# Patient Record
Sex: FEMALE | Race: AMERICAN INDIAN OR ALASKA NATIVE
[De-identification: names, ages, dates, MRNs, and addresses within clinical notes are randomized per-mention and may not be internally consistent; named-entity substitution may affect disease eponyms.]

---

## 2017-05-08 ENCOUNTER — HOSPITAL ENCOUNTER (EMERGENCY)
Dept: HOSPITAL 5 - ED | Age: 27
Discharge: HOME | End: 2017-05-08
Payer: COMMERCIAL

## 2017-05-08 VITALS — SYSTOLIC BLOOD PRESSURE: 105 MMHG | DIASTOLIC BLOOD PRESSURE: 66 MMHG
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
## 2017-05-08 DIAGNOSIS — S50.812A: Primary | ICD-10-CM
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
## 2017-05-08 DIAGNOSIS — J45.909: ICD-10-CM
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
## 2017-05-08 DIAGNOSIS — M79.1: ICD-10-CM

## 2017-05-08 PROCEDURE — 93005 ELECTROCARDIOGRAM TRACING: CPT

## 2017-05-08 PROCEDURE — 73080 X-RAY EXAM OF ELBOW: CPT

## 2017-05-08 PROCEDURE — 90715 TDAP VACCINE 7 YRS/> IM: CPT

## 2017-05-08 PROCEDURE — A6250 SKIN SEAL PROTECT MOISTURIZR: HCPCS

## 2017-05-08 PROCEDURE — 99284 EMERGENCY DEPT VISIT MOD MDM: CPT

## 2017-05-08 PROCEDURE — 90471 IMMUNIZATION ADMIN: CPT

## 2017-05-08 PROCEDURE — 93010 ELECTROCARDIOGRAM REPORT: CPT

## 2017-05-08 PROCEDURE — 73090 X-RAY EXAM OF FOREARM: CPT

## 2017-05-08 PROCEDURE — 71020: CPT

## 2017-05-08 NOTE — XRAY REPORT
ROUTINE CHEST, TWO VIEWS:



HISTORY:  chest pain.



The trachea, heart, mediastinal contour, lung fields and bony thorax 

are unremarkable.



IMPRESSION:

Unremarkable chest x-ray.

## 2017-05-08 NOTE — XRAY REPORT
LEFT FOREARM:



History: Left forearm pain.



AP and lateral views of the forearm demonstrate normal

mineralization and contours for this patient's age.  No destructive

changes are noted and the adjacent soft tissues are normal.



IMPRESSION:

Normal left forearm.

## 2017-05-08 NOTE — XRAY REPORT
LEFT ELBOW, 3 views:



History: Left elbow pain



The bony architecture is intact without evidence of fracture or

dislocation.  No significant soft tissue abnormality is seen.



IMPRESSION:

Normal left elbow.
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
## 2017-07-30 ENCOUNTER — HOSPITAL ENCOUNTER (EMERGENCY)
Dept: HOSPITAL 5 - ED | Age: 27
LOS: 1 days | Discharge: HOME | End: 2017-07-31
Payer: COMMERCIAL

## 2017-07-30 DIAGNOSIS — Z91.013: ICD-10-CM

## 2017-07-30 DIAGNOSIS — J45.909: ICD-10-CM

## 2017-07-30 DIAGNOSIS — K64.9: Primary | ICD-10-CM

## 2017-07-31 VITALS — DIASTOLIC BLOOD PRESSURE: 56 MMHG | SYSTOLIC BLOOD PRESSURE: 100 MMHG

## 2017-07-31 NOTE — EMERGENCY DEPARTMENT REPORT
ED General Adult HPI





- General


Chief complaint: Rectal Pain


Stated complaint: HEMORRHOIDS


Time Seen by Provider: 17 01:24


Source: patient


Mode of arrival: Ambulatory


Limitations: No Limitations





- History of Present Illness


Initial comments: 





This is a 27-year-old female nontoxic, well nourished in appearance, no acute 

signs of distress the procedure ED complaining of hemorrhoids 2 days.  Patient 

stated has a history of hemorrhoids with last occurrence was 21 months ago when 

she gave birth.  Patient describes pain as sharp and throbbing level of 10 out 

of 10.  Patient agrees to minimal bleeding during episode but denies bleeding 

6 hours from now.  Patient stated she just had a bowel movement and is relieved 

from pain.  Patient also stated she was able to put hemorrhoids back into anus. 

Patient denies any fever, chills, headache, bleeding, dizziness, blurred vision

, chest pain, shortness of breath, numbness or tingling.  Patient states 

allergies to shellfish and history of asthma.  Last menstrual period 17.


MD Complaint: hemorrhoids


-: Gradual, days(s) (2)


Radiation: non-radiation


Severity scale (0 -10): 10


Quality: stabbing, other (throbbing)


Consistency: intermittent


Improves with: none


Worsens with: none


Associated Symptoms: denies other symptoms.  denies: confusion, chest pain, 

cough, diaphoresis, fever/chills, headaches, loss of appetite, malaise, nausea/

vomiting, rash, seizure, shortness of breath, syncope, weakness


Treatments Prior to Arrival: none





- Related Data


 Previous Rx's











 Medication  Instructions  Recorded  Last Taken  Type


 


Cyclobenzaprine [Flexeril 10 MG 10 mg PO QHS #20 tablet 17 Unknown Rx





TAB]    


 


Ibuprofen [Motrin] 800 mg PO Q8HR PRN #30 tablet 17 Unknown Rx


 


Hydrocortisone/Lidocaine/Aloe 1 each RC DAILY #1 kit 17 Unknown Rx





[Lidocaine-Hc 2-2% Cream Kit]    











 Allergies











Allergy/AdvReac Type Severity Reaction Status Date / Time


 


shellfish derived Allergy  Hives Verified 14 04:58














ED Review of Systems


ROS: 


Stated complaint: HEMORRHOIDS


Other details as noted in HPI





Constitutional: denies: chills, fever


Eyes: denies: eye pain, eye discharge, vision change


ENT: denies: ear pain, throat pain


Respiratory: denies: cough, shortness of breath, wheezing


Cardiovascular: denies: chest pain, palpitations


Endocrine: no symptoms reported


Gastrointestinal: denies: abdominal pain, nausea, diarrhea


Genitourinary: denies: urgency, dysuria, discharge


Musculoskeletal: denies: back pain, joint swelling, arthralgia


Skin: denies: rash, lesions


Neurological: denies: headache, weakness, paresthesias


Psychiatric: denies: anxiety, depression


Hematological/Lymphatic: denies: easy bleeding, easy bruising





ED Past Medical Hx





- Past Medical History


Previous Medical History?: Yes


Hx Asthma: Yes





- Surgical History


Past Surgical History?: No


Additional Surgical History: 





- Social History


Smoking Status: Never Smoker





- Medications


Home Medications: 


 Home Medications











 Medication  Instructions  Recorded  Confirmed  Last Taken  Type


 


Cyclobenzaprine [Flexeril 10 MG 10 mg PO QHS #20 tablet 17  Unknown Rx





TAB]     


 


Ibuprofen [Motrin] 800 mg PO Q8HR PRN #30 tablet 17  Unknown Rx


 


Hydrocortisone/Lidocaine/Aloe 1 each RC DAILY #1 kit 17  Unknown Rx





[Lidocaine-Hc 2-2% Cream Kit]     














ED Physical Exam





- General


Limitations: No Limitations


General appearance: alert, in no apparent distress





- Head


Head exam: Present: atraumatic, normocephalic, normal inspection





- Eye


Eye exam: Present: normal appearance, PERRL, EOMI.  Absent: scleral icterus, 

conjunctival injection, nystagmus, periorbital swelling, periorbital tenderness


Pupils: Present: normal accommodation





- ENT


ENT exam: Present: normal exam, normal orophraynx, mucous membranes moist, TM's 

normal bilaterally, normal external ear exam





- Neck


Neck exam: Present: normal inspection, full ROM.  Absent: tenderness, 

meningismus, lymphadenopathy, thyromegaly





- Respiratory


Respiratory exam: Present: normal lung sounds bilaterally.  Absent: respiratory 

distress, wheezes, rales, rhonchi, stridor, chest wall tenderness, accessory 

muscle use, decreased breath sounds, prolonged expiratory





- Cardiovascular


Cardiovascular Exam: Present: regular rate, normal rhythm, normal heart sounds.

  Absent: bradycardia, tachycardia, irregular rhythm, systolic murmur, 

diastolic murmur, rubs, gallop





- GI/Abdominal


GI/Abdominal exam: Present: soft, normal bowel sounds.  Absent: distended, 

tenderness, guarding, rebound, rigid, diminished bowel sounds





- Rectal


Rectal exam: Present: deferred, normal inspection, normal rectal tone, other (

chaperone Maru paramedic present during exam).  Absent: heme (+) stool, 

bloody stool, fecal impaction, hemorrhoids (no visual hemorrhoids. ), mass, 

tenderness





- Extremities Exam


Extremities exam: Present: normal inspection, full ROM, normal capillary 

refill.  Absent: tenderness, pedal edema, joint swelling, calf tenderness





- Back Exam


Back exam: Present: normal inspection, full ROM.  Absent: tenderness, CVA 

tenderness (R), CVA tenderness (L), muscle spasm, paraspinal tenderness, 

vertebral tenderness, rash noted





- Neurological Exam


Neurological exam: Present: alert, oriented X3, CN II-XII intact, normal gait, 

reflexes normal.  Absent: abnormal gait





- Psychiatric


Psychiatric exam: Present: normal affect, normal mood





- Skin


Skin exam: Present: warm, dry, intact, normal color.  Absent: rash





ED Course





 Vital Signs











  17





  00:16


 


Temperature 97.8 F


 


Pulse Rate 89


 


Respiratory 18





Rate 


 


Blood Pressure 104/57


 


Blood Pressure 105/57





[Left] 


 


O2 Sat by Pulse 99





Oximetry 














- Reevaluation(s)


Reevaluation #1: 





17 01:44


Patient is able to speak in full sentences with no signs of distress.





ED Medical Decision Making





- Medical Decision Making





ED course; this is a 27-year-old female that presents with subjective 

hemorrhoids





1- patient was examined by myself.  Upon examination with chaperone Maru 

present there is no hemorrhoids present.  Patient stated prior to me examining 

the patient said had a bowel movement and put hemorrhoids back.  Patient denies 

any bleeding episode.


2- patient was educated to increase fiber intake as well as sitz bath.  Patient 

received hydrocortisone/lidocaine ointment at discharge and was instructed to 

follow-up with her primary care doctor/general surgeon in 3-5 days or symptoms 

such as increased bleeding, protruding hemorrhoids, fever, chills, chest pain, 

short of breath, constipation, dizziness, return to emergency room as soon as 

possible.


3- At time time of discharge, the patient does not seem toxic or ill in 

appearance.  No acute signs of distress noted.  Patient agrees to discharge 

treatment plan of care.  No further questions noted by the patient.


Critical care attestation.: 


If time is entered above; I have spent that time in minutes in the direct care 

of this critically ill patient, excluding procedure time.








ED Disposition


Clinical Impression: 


Hemorrhoids


Qualifiers:


 Hemorrhoid type: unspecified Qualified Code(s): K64.9 - Unspecified hemorrhoids





Disposition:  TO HOME OR SELFCARE


Is pt being admited?: No


Does the pt Need Aspirin: No


Condition: Stable


Instructions:  Hemorrhoids (ED), Sitz Bath (GEN), Hydrocortisone/Lidocaine (

Rectal), High Fiber Diet (ED)


Additional Instructions: 


follow-up with your primary care doctor/general surgeon in 3-5 days or symptoms 

such as increased bleeding, protruding hemorrhoids, fever, chills, chest pain, 

short of breath, constipation, dizziness, return to emergency room as soon as 

possible.


Perform sitz bath and increase fiber intake as directed.  Use hydrocortisone/

lidocaine ointment as needed for pain rectally.


Prescriptions: 


Hydrocortisone/Lidocaine/Aloe [Lidocaine-Hc 2-2% Cream Kit] 1 each RC DAILY #1 

kit


Referrals: 


[facility] [Outside] - 3-5 Days


[facility] [Outside] - 3-5 Days


BRIAN PARRY MD [Staff Physician] - 3-5 Days


PRIMARY CARE,MD [Primary Care Provider] - 3-5 Days


AUTUMN YING MD [Staff Physician] - 3-5 Days


Forms:  Work/School Release Form(ED)

## 2020-10-26 ENCOUNTER — HOSPITAL ENCOUNTER (EMERGENCY)
Dept: HOSPITAL 5 - ED | Age: 30
LOS: 2 days | Discharge: PSYCHIATRIC OR SUBSTANCE USE FACILITY | End: 2020-10-28
Payer: SELF-PAY

## 2020-10-26 DIAGNOSIS — Z98.890: ICD-10-CM

## 2020-10-26 DIAGNOSIS — R46.2: Primary | ICD-10-CM

## 2020-10-26 DIAGNOSIS — Z79.899: ICD-10-CM

## 2020-10-26 DIAGNOSIS — Z91.013: ICD-10-CM

## 2020-10-26 DIAGNOSIS — J45.909: ICD-10-CM

## 2020-10-26 LAB
BACTERIA #/AREA URNS HPF: (no result) /HPF
BASOPHILS # (AUTO): 0 K/MM3 (ref 0–0.1)
BASOPHILS NFR BLD AUTO: 0.7 % (ref 0–1.8)
BENZODIAZEPINES SCREEN,URINE: (no result)
BILIRUB UR QL STRIP: (no result)
BLOOD UR QL VISUAL: (no result)
BUN SERPL-MCNC: 22 MG/DL (ref 7–17)
BUN/CREAT SERPL: 24 %
CALCIUM SERPL-MCNC: 9.8 MG/DL (ref 8.4–10.2)
EOSINOPHIL # BLD AUTO: 0 K/MM3 (ref 0–0.4)
EOSINOPHIL NFR BLD AUTO: 0.6 % (ref 0–4.3)
HCT VFR BLD CALC: 39.3 % (ref 30.3–42.9)
HEMOLYSIS INDEX: 12
HGB BLD-MCNC: 13.2 GM/DL (ref 10.1–14.3)
LYMPHOCYTES # BLD AUTO: 1 K/MM3 (ref 1.2–5.4)
LYMPHOCYTES NFR BLD AUTO: 16 % (ref 13.4–35)
MCHC RBC AUTO-ENTMCNC: 34 % (ref 30–34)
MCV RBC AUTO: 87 FL (ref 79–97)
METHADONE SCREEN,URINE: (no result)
MONOCYTES # (AUTO): 0.5 K/MM3 (ref 0–0.8)
MONOCYTES % (AUTO): 7.2 % (ref 0–7.3)
MUCOUS THREADS #/AREA URNS HPF: (no result) /HPF
OPIATE SCREEN,URINE: (no result)
PH UR STRIP: 5 [PH] (ref 5–7)
PLATELET # BLD: 256 K/MM3 (ref 140–440)
RBC # BLD AUTO: 4.53 M/MM3 (ref 3.65–5.03)
RBC #/AREA URNS HPF: 5 /HPF (ref 0–6)
UROBILINOGEN UR-MCNC: < 2 MG/DL (ref ?–2)
WBC #/AREA URNS HPF: 8 /HPF (ref 0–6)

## 2020-10-26 PROCEDURE — 80320 DRUG SCREEN QUANTALCOHOLS: CPT

## 2020-10-26 PROCEDURE — G0480 DRUG TEST DEF 1-7 CLASSES: HCPCS

## 2020-10-26 PROCEDURE — 70450 CT HEAD/BRAIN W/O DYE: CPT

## 2020-10-26 PROCEDURE — 36415 COLL VENOUS BLD VENIPUNCTURE: CPT

## 2020-10-26 PROCEDURE — 85025 COMPLETE CBC W/AUTO DIFF WBC: CPT

## 2020-10-26 PROCEDURE — 80307 DRUG TEST PRSMV CHEM ANLYZR: CPT

## 2020-10-26 PROCEDURE — 80048 BASIC METABOLIC PNL TOTAL CA: CPT

## 2020-10-26 PROCEDURE — 81001 URINALYSIS AUTO W/SCOPE: CPT

## 2020-10-26 PROCEDURE — 84703 CHORIONIC GONADOTROPIN ASSAY: CPT

## 2020-10-26 NOTE — CAT SCAN REPORT
. CT head/brain wo con



INDICATION / CLINICAL INFORMATION:

30 years Female; Delusions, auditory hallucinations new onset.



TECHNIQUE: Routine CT head without contrast. All CT scans at this location are performed using CT dos
e reduction for ALARA by means of automated exposure control.



COMPARISON: 

None.



FINDINGS:



BRAIN / INTRACRANIAL CONTENTS: No acute hemorrhage, mass effect, midline shift, hydrocephalus, or acu
te, large territorial infarct. No chronic infarct or atrophy appreciated. No significant white matter
 abnormality.



CRANIOCERVICAL JUNCTION: No significant abnormality.



ORBITS: No significant abnormality of visualized orbits.

SINUSES / MASTOIDS: No significant abnormality in the visualized paranasal sinuses or mastoid air elijah
ls.



ADDITIONAL FINDINGS: None. 



IMPRESSION:

1. No focal mass, hemorrhage, hydrocephalus, or acute, large territorial infarct. 



Signer Name: Juan Ryder MD, III 

Signed: 10/26/2020 7:30 PM

Workstation Name: [id number] [id number]

## 2020-10-26 NOTE — EMERGENCY DEPARTMENT REPORT
HPI





- General


Chief Complaint: Psych


Time Seen by Provider: 10/26/20 16:26





- HPI


HPI: 





Room 16








The patient is a 30-year-old female present with a chief complaint of bizarre 

behavior.  The patient was brought in by EMS for reports of auditory 

hallucinations, not sleeping eating or bathing for 4 days.  The patient presents

with a 1013 filled out by Saskia Hernadez LPC which states "confused, delusions,

singing answers, referring to self and third person.  No sleeping/eating in 3 to

4 days.  No ADLs in 3 to 4 days, delusions, confusion."  When asked why she was 

brought to emergency department the patient states "I was just talking about 

malingering in [facility]."  When asked what this means the patient does not 

respond.  The patient just repeats "lingering at the [facility]'s 

office."  When asked that she has had suicidal ideation patient replies "I do 

not know."  Patient denies homicidal ideation.





ED Past Medical Hx





- Past Medical History


Hx Asthma: Yes





- Surgical History


Additional Surgical History: 





- Family History


Family history: no significant





- Social History


Smoking Status: Never Smoker


Substance Use Type: None (Denies illicit drug use)





- Medications


Home Medications: 


                                Home Medications











 Medication  Instructions  Recorded  Confirmed  Last Taken  Type


 


Cyclobenzaprine [Flexeril 10 MG 10 mg PO QHS #20 tablet 05/08/17 10/26/20 

Unknown Rx





TAB]     


 


Ibuprofen [Motrin] 800 mg PO Q8HR PRN #30 tablet 05/08/17 10/26/20 Unknown Rx


 


Lidocaine/Hydrocortisone AC 1 each RC DAILY #1 kit 07/31/17 10/26/20 Unknown Rx





[Lidocaine-Hc 2-2% Cream Kit]     














ED Review of Systems


ROS: 


Stated complaint: DELUSIONAL THOUGHTS/CONFUSION


Other details as noted in HPI





Constitutional: no symptoms reported


Respiratory: no symptoms reported


Endocrine: no symptoms reported


Psychiatric: auditory hallucinations, suicidal thoughts (?)





Physical Exam





- Physical Exam


Vital Signs: 


                                   Vital Signs











  10/26/20





  16:18


 


Temperature 98.7 F


 


Pulse Rate 92 H


 


Respiratory 18





Rate 


 


Blood Pressure 117/82





[Left] 


 


O2 Sat by Pulse 97





Oximetry 











Physical Exam: 





GENERAL: The patient is well-developed well-nourished female lying on stretcher 

not appearing to be in acute distress. []


HEENT: Normocephalic.  Atraumatic.  Extraocular motions are intact.  Patient has

 moist mucous membranes.


NECK: Supple.  Trachea midline


CHEST/LUNGS: Clear to auscultation.  There is no respiratory distress noted.


HEART/CARDIOVASCULAR: Regular.  There is no tachycardia.  There is no gallop rub

 or murmur.


ABDOMEN: Abdomen is soft, nontender.  Patient has normal bowel sounds.  There is

 no abdominal distention.


SKIN: There is no rash.  There is no edema.  There is no diaphoresis.


NEURO: The patient is awake, alert, and oriented.  The patient is cooperative.  

The patient has no focal neurologic deficits.  The patient has normal speech


MUSCULOSKELETAL:There is no evidence of acute injury.





ED Course


                                   Vital Signs











  10/26/20





  16:18


 


Temperature 98.7 F


 


Pulse Rate 92 H


 


Respiratory 18





Rate 


 


Blood Pressure 117/82





[Left] 


 


O2 Sat by Pulse 97





Oximetry 














ED Medical Decision Making





- Lab Data


Result diagrams: 


                                 10/26/20 16:42





                                 10/26/20 16:42





- Radiology Data


Radiology results: report reviewed (CT head), image reviewed (CT head)





[facility] [address] [id number] Cat

 Scan Report Signed Patient: SYMONE CARTER MR#: [id number] : 

1990 Acct:[id number] Age/Sex: 30 / F ADM Date: 10/26/20 Loc: ED 

Attending Dr: Ordering Physician: SHERLY MORALES MD Date of Service: 10/26/20 

Procedure(s): CT head/brain wo con Accession Number(s): [id number] cc: SHERLY MORALES MD . CT head/brain wo con INDICATION / CLINICAL INFORMATION: 30 years Female; 

Delusions, auditory hallucinations new onset. TECHNIQUE: Routine CT head without

 contrast. All CT scans at this location are performed using CT dose reduction 

for ALARA by means of automated exposure control. COMPARISON: None. FINDINGS: 

BRAIN / INTRACRANIAL CONTENTS: No acute hemorrhage, mass effect, midline shift, 

hydrocephalus, or acute, large territorial infarct. No chronic infarct or 

atrophy appreciated. No significant white matter abnormality. CRANIOCERVICAL 

JUNCTION: No significant abnormality. ORBITS: No significant abnormality of 

visualized orbits. SINUSES / MASTOIDS: No significant abnormality in the 

visualized paranasal sinuses or mastoid air cells. ADDITIONAL FINDINGS: None. 

IMPRESSION: 1. No focal mass, hemorrhage, hydrocephalus, or acute, large 

territorial infarct. Signer Name: Juan Ryder MD, III Signed: 10/26/2020 

7:30 PM Workstation Name: [email or web address] Transcribed By: HR Dictated By: Juan Ryder MD Electronically Authenticated By: Juan Ryder MD Signed 

Date/Time: 10/26/20 1930 DD/DT: 10/26/20 1929 TD/TT: 





- Differential Diagnosis


Schizophrenia, adjustment disorder, depression, anxiety,


Critical care attestation.: 


If time is entered above; I have spent that time in minutes in the direct care 

of this critically ill patient, excluding procedure time.








ED Disposition


Clinical Impression: 


 Bizarre behavior





Disposition: [id number]/[id number] PSY HOSP/PSY UNIT


Is pt being admited?: No


Does the pt Need Aspirin: No


Condition: Stable


Time of Disposition: 19:38 (Awaiting acceptance)

## 2020-10-27 RX ADMIN — DIVALPROEX SODIUM SCH MG: 125 TABLET, DELAYED RELEASE ORAL at 22:13

## 2020-10-27 RX ADMIN — NITROFURANTOIN MONOHYDRATE/MACROCRYSTALS SCH: 75; 25 CAPSULE ORAL at 23:52

## 2020-10-27 RX ADMIN — DIVALPROEX SODIUM SCH MG: 125 TABLET, DELAYED RELEASE ORAL at 22:45

## 2020-10-27 RX ADMIN — NITROFURANTOIN MONOHYDRATE/MACROCRYSTALS SCH MG: 75; 25 CAPSULE ORAL at 23:52

## 2020-10-27 NOTE — CONSULTATION
History of Present Illness





- Reason for Consult


Consult date: 10/27/20


Reason for consult: delusions, hallucinations





- History of Present Psychiatric Illness


Dorcas Rowe is a 29y/o female who was brought to the ER for hallucinations, 

disorganized thoughts and not bathing for 4 days according to document. During 

my interview today, the patient is a/o x 3. She is talking in a low tone. She is

responding to internal stimuli. Her thoughts are disorganized. She tells me she 

can't hardly walk because of her legs. She denies hallucinations, but, she 

pauses at times in between questioning and appears to be listening to something.

She says she came to the hospital because "I kept talking about [facility]." 

When asked why was this a reason to come to the hospital she says, "I've been 

thinking about staying in good standing." She says, "yesterday the voices in my 

head was talking about my old job." The patient denies being SI/HI and denies 

ever having suicidal attempt. She says she was on "zoloft for depression a long 

time ago."





Psychiatric History


Diagnoses: Depression


Suicide attempts: Denies


Hospitalizations: Denies


Medications tried: Zoloft


Outpatient treatment: Yes





Past Medical History


None reported





Social History


Status: 


Living arrangement: with mom


Substance abuse: denies


Highest level of education: 


Legal history: Denies





REVIEW OF SYSTEMS


Constitutional: Negative for weight loss


ENT: Negative for stridor


Respiratory: Negative for cough or hemoptysis


All other systems reviewed and are negative





MSE


 Appearance: Dressed appropriately. Awake


 Behavior: calm and cooperative


 Mood: "okay"


 Affect: Congruent


 Thought Process: disorganized


 Speech: Normal tone and pace


 Thought Content


    Suicidal: Denies


    Homicidal: Denies


    Hallucinations: auditory


    Delusions: None elicted


 Consciousness: Alert


 Cognition/Memory: Impaired


 Insight/Judgment: Limited





Diagnoses: 


Major Depressive Disorder w/Psychotic Features





Plan


Risperidone 0.25mg po bid


Depakote Dr 125mg po bid


Trazodone 50mg po qhs


Sitter: Defer to primary


Medical: Per primary


Disposition: recommend acute inpatient treatment. 


Will follow. Thank you for this consult. 





Medications and Allergies


                                    Allergies











Allergy/AdvReac Type Severity Reaction Status Date / Time


 


shellfish derived Allergy  Hives Verified 12/07/14 04:58











                                Home Medications











 Medication  Instructions  Recorded  Confirmed  Last Taken  Type


 


Cyclobenzaprine [Flexeril 10 MG 10 mg PO QHS #20 tablet 05/08/17 10/26/20 

Unknown Rx





TAB]     


 


Ibuprofen [Motrin] 800 mg PO Q8HR PRN #30 tablet 05/08/17 10/26/20 Unknown Rx


 


Lidocaine/Hydrocortisone AC 1 each RC DAILY #1 kit 07/31/17 10/26/20 Unknown Rx





[Lidocaine-Hc 2-2% Cream Kit]     














Mental Status Exam





- Vital signs


                                Last Vital Signs











Temp  98.1 F   10/26/20 20:16


 


Pulse  74   10/26/20 20:16


 


Resp  16   10/26/20 20:16


 


BP  117/65   10/26/20 20:16


 


Pulse Ox  100   10/26/20 20:16














Results


Result Diagrams: 


                                 10/26/20 16:42





                                 10/26/20 16:42


All other labs normal.

## 2020-10-28 VITALS — SYSTOLIC BLOOD PRESSURE: 138 MMHG | DIASTOLIC BLOOD PRESSURE: 70 MMHG

## 2020-10-28 RX ADMIN — DIVALPROEX SODIUM SCH: 125 TABLET, DELAYED RELEASE ORAL at 10:07

## 2020-10-28 RX ADMIN — NITROFURANTOIN MONOHYDRATE/MACROCRYSTALS SCH MG: 75; 25 CAPSULE ORAL at 10:07

## 2020-11-15 ENCOUNTER — HOSPITAL ENCOUNTER (EMERGENCY)
Dept: HOSPITAL 5 - ED | Age: 30
Discharge: HOME | End: 2020-11-15
Payer: SELF-PAY

## 2020-11-15 VITALS — SYSTOLIC BLOOD PRESSURE: 135 MMHG | DIASTOLIC BLOOD PRESSURE: 84 MMHG

## 2020-11-15 DIAGNOSIS — Z79.899: ICD-10-CM

## 2020-11-15 DIAGNOSIS — J39.2: Primary | ICD-10-CM

## 2020-11-15 DIAGNOSIS — Z98.890: ICD-10-CM

## 2020-11-15 DIAGNOSIS — Z91.013: ICD-10-CM

## 2020-11-15 DIAGNOSIS — J45.909: ICD-10-CM

## 2020-11-15 PROCEDURE — 99282 EMERGENCY DEPT VISIT SF MDM: CPT

## 2020-11-15 NOTE — EMERGENCY DEPARTMENT REPORT
ED General Adult HPI





- General


Chief complaint: Medical Clearance


Stated complaint: BURNING ON FEET


Time Seen by Provider: 11/15/20 15:00


Source: patient


Mode of arrival: Ambulatory


Limitations: No Limitations





- History of Present Illness


Initial comments: 





This is a 30-year-old female nontoxic, well nourished in appearance, no acute 

signs of distress presents to the ED with c/o of throat itching and left foot 

itching after taking new medication that was prescribed last week.  Denies any 

SI/HI. Patient currently stated symptoms has resolved and subsided.  Patient 

denies any angioedema.  Denies any difficulty breathing.  Denies any rash.  

Denies any fever, chills, nausea, vomiting, chest pain, shortness of breath, 

headache or stiff neck.  Denies any drug allergies.


-: days(s)


Radiation: non-radiation


Severity scale (0 -10): 0


Consistency: now resolved


Improves with: none


Worsens with: none


Associated Symptoms: denies other symptoms.  denies: confusion, chest pain, cou

gh, diaphoresis, fever/chills, headaches, loss of appetite, malaise, 

nausea/vomiting, rash, seizure, shortness of breath, syncope, weakness


Treatments Prior to Arrival: none





- Related Data


                                  Previous Rx's











 Medication  Instructions  Recorded  Last Taken  Type


 


Cyclobenzaprine [Flexeril 10 MG 10 mg PO QHS #20 tablet 17 Unknown Rx





TAB]    


 


Ibuprofen [Motrin] 800 mg PO Q8HR PRN #30 tablet 17 Unknown Rx


 


Lidocaine/Hydrocortisone AC 1 each RC DAILY #1 kit 17 Unknown Rx





[Lidocaine-Hc 2-2% Cream Kit]    


 


Nitrofurantoin Mono/M-Cryst 100 mg PO Q12HR #14 capsule 10/28/20 Unknown Rx





[Macrobid CAP]    











                                    Allergies











Allergy/AdvReac Type Severity Reaction Status Date / Time


 


shellfish derived Allergy  Hives Verified 11/15/20 08:49














ED Review of Systems


ROS: 


Stated complaint: BURNING ON FEET


Other details as noted in HPI





Comment: All other systems reviewed and negative


Constitutional: denies: chills, fever


Eyes: denies: eye pain, eye discharge, vision change


ENT: denies: ear pain, throat pain


Respiratory: denies: cough, shortness of breath, wheezing


Cardiovascular: denies: chest pain, palpitations


Endocrine: no symptoms reported


Gastrointestinal: denies: abdominal pain, nausea, diarrhea


Genitourinary: denies: urgency, dysuria, discharge


Musculoskeletal: denies: back pain, joint swelling, arthralgia


Skin: denies: rash, lesions


Neurological: denies: headache, weakness, paresthesias


Psychiatric: denies: anxiety, depression, auditory hallucinations, visual 

hallucinations, homicidal thoughts, suicidal thoughts


Hematological/Lymphatic: denies: easy bleeding, easy bruising





ED Past Medical Hx





- Past Medical History


Hx Asthma: Yes





- Surgical History


Additional Surgical History: 





- Social History


Smoking Status: Never Smoker


Substance Use Type: None





- Medications


Home Medications: 


                                Home Medications











 Medication  Instructions  Recorded  Confirmed  Last Taken  Type


 


Cyclobenzaprine [Flexeril 10 MG 10 mg PO QHS #20 tablet 05/08/17 10/26/20 

Unknown Rx





TAB]     


 


Ibuprofen [Motrin] 800 mg PO Q8HR PRN #30 tablet 05/08/17 10/26/20 Unknown Rx


 


Lidocaine/Hydrocortisone AC 1 each RC DAILY #1 kit 07/31/17 10/26/20 Unknown Rx





[Lidocaine-Hc 2-2% Cream Kit]     


 


Nitrofurantoin Mono/M-Cryst 100 mg PO Q12HR #14 capsule 10/28/20  Unknown Rx





[Macrobid CAP]     














ED Physical Exam





- General


Limitations: No Limitations


General appearance: alert, in no apparent distress





- Head


Head exam: Present: atraumatic, normocephalic





- Eye


Eye exam: Present: normal appearance





- ENT


ENT exam: Present: normal exam, normal orophraynx, other (Uvula midline.  

Negative angioedema.)





- Neck


Neck exam: Present: normal inspection, full ROM.  Absent: tenderness, 

meningismus, lymphadenopathy





- Respiratory


Respiratory exam: Absent: respiratory distress





- Cardiovascular


Cardiovascular Exam: Present: regular rate





- Extremities Exam


Extremities exam: Present: normal inspection, full ROM, normal capillary refill.

 Absent: tenderness, joint swelling





- Back Exam


Back exam: Present: normal inspection





- Neurological Exam


Neurological exam: Present: alert, oriented X3, normal gait





- Psychiatric


Psychiatric exam: Present: normal affect, normal mood.  Absent: depressed, 

agitated, anxious, flat affect, manic, homicidal ideation, suicidal ideation





- Skin


Skin exam: Present: warm, dry, intact, normal color.  Absent: rash





ED Course


                                   Vital Signs











  11/15/20 11/15/20





  08:50 15:46


 


Temperature 98.3 F 98.8 F


 


Pulse Rate 112 H 78


 


Respiratory 20 18





Rate  


 


Blood Pressure 135/84 


 


O2 Sat by Pulse 97 100





Oximetry  














- Reevaluation(s)


Reevaluation #1: 





11/15/20 15:27


Patient is speaking in full sentences with no signs of distress noted.





ED Medical Decision Making





- Medical Decision Making





Patient is stable and was examined by me.  Exam is unremarkable.  Vital signs 

are stable at discharge.  Patient did state that everything has resolved but 

wanted to come to the ER for a follow-up.  Patient was instructed to follow-up 

with a primary care doctor in 3-5 days or if symptoms worsen and continue return

to emergency room as soon as possible.  At time of discharge, the patient does 

not seem toxic or ill in appearance.  No acute signs of distress noted.  Patient

agrees to discharge treatment plan of care.  No further questions noted by the 

patient.


Critical care attestation.: 


If time is entered above; I have spent that time in minutes in the direct care 

of this critically ill patient, excluding procedure time.








ED Disposition


Clinical Impression: 


 Throat irritation





Disposition: DC-01 TO HOME OR SELFCARE


Is pt being admited?: No


Does the pt Need Aspirin: No


Condition: Stable


Additional Instructions: 


Follow-up with a primary care doctor in 3-5 days or if symptoms worsen and 

continue return to emergency room as soon as possible. 


Referrals: 


TYRONE DORADO MD [Primary Care Provider] - 3-5 Days


RIANA RESENDIZ MD [Staff Physician] - 3-5 Days


Time of Disposition: 15:28

## 2020-11-23 ENCOUNTER — HOSPITAL ENCOUNTER (EMERGENCY)
Dept: HOSPITAL 5 - ED | Age: 30
Discharge: HOME | End: 2020-11-23
Payer: SELF-PAY

## 2020-11-23 ENCOUNTER — HOSPITAL ENCOUNTER (EMERGENCY)
Dept: HOSPITAL 5 - ED | Age: 30
Discharge: LEFT BEFORE DISCHARGE | End: 2020-11-23
Payer: SELF-PAY

## 2020-11-23 VITALS — SYSTOLIC BLOOD PRESSURE: 126 MMHG | DIASTOLIC BLOOD PRESSURE: 78 MMHG

## 2020-11-23 VITALS — DIASTOLIC BLOOD PRESSURE: 54 MMHG | SYSTOLIC BLOOD PRESSURE: 94 MMHG

## 2020-11-23 DIAGNOSIS — Z79.899: ICD-10-CM

## 2020-11-23 DIAGNOSIS — R11.0: ICD-10-CM
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
## 2020-11-23 DIAGNOSIS — Z98.890: ICD-10-CM

## 2020-11-23 DIAGNOSIS — Z53.21: ICD-10-CM

## 2020-11-23 DIAGNOSIS — T50.905A: Primary | ICD-10-CM

## 2020-11-23 DIAGNOSIS — Z91.013: ICD-10-CM

## 2020-11-23 DIAGNOSIS — J45.909: ICD-10-CM

## 2020-11-23 DIAGNOSIS — R10.9: Primary | ICD-10-CM

## 2020-11-23 LAB
ALBUMIN SERPL-MCNC: 4.3 G/DL (ref 3.9–5)
ALT SERPL-CCNC: 11 UNITS/L (ref 7–56)
BACTERIA #/AREA URNS HPF: (no result) /HPF
BASOPHILS # (AUTO): 0 K/MM3 (ref 0–0.1)
BASOPHILS NFR BLD AUTO: 0.4 % (ref 0–1.8)
BILIRUB UR QL STRIP: (no result)
BLOOD UR QL VISUAL: (no result)
BUN SERPL-MCNC: 14 MG/DL (ref 7–17)
BUN/CREAT SERPL: 18 %
CALCIUM SERPL-MCNC: 9.5 MG/DL (ref 8.4–10.2)
EOSINOPHIL # BLD AUTO: 0.3 K/MM3 (ref 0–0.4)
EOSINOPHIL NFR BLD AUTO: 5.9 % (ref 0–4.3)
HCT VFR BLD CALC: 39 % (ref 30.3–42.9)
HEMOLYSIS INDEX: 2
HGB BLD-MCNC: 13.5 GM/DL (ref 10.1–14.3)
LYMPHOCYTES # BLD AUTO: 1 K/MM3 (ref 1.2–5.4)
LYMPHOCYTES NFR BLD AUTO: 20.6 % (ref 13.4–35)
MCHC RBC AUTO-ENTMCNC: 35 % (ref 30–34)
MCV RBC AUTO: 85 FL (ref 79–97)
MONOCYTES # (AUTO): 0.3 K/MM3 (ref 0–0.8)
MONOCYTES % (AUTO): 5.4 % (ref 0–7.3)
MUCOUS THREADS #/AREA URNS HPF: (no result) /HPF
PH UR STRIP: 5 [PH] (ref 5–7)
PLATELET # BLD: 260 K/MM3 (ref 140–440)
PROT UR STRIP-MCNC: (no result) MG/DL
RBC # BLD AUTO: 4.57 M/MM3 (ref 3.65–5.03)
RBC #/AREA URNS HPF: 4 /HPF (ref 0–6)
UROBILINOGEN UR-MCNC: 4 MG/DL (ref ?–2)
WBC #/AREA URNS HPF: 3 /HPF (ref 0–6)

## 2020-11-23 PROCEDURE — 80053 COMPREHEN METABOLIC PANEL: CPT

## 2020-11-23 PROCEDURE — 81001 URINALYSIS AUTO W/SCOPE: CPT

## 2020-11-23 PROCEDURE — 36415 COLL VENOUS BLD VENIPUNCTURE: CPT

## 2020-11-23 PROCEDURE — 85025 COMPLETE CBC W/AUTO DIFF WBC: CPT

## 2020-11-23 PROCEDURE — 83690 ASSAY OF LIPASE: CPT

## 2020-11-23 PROCEDURE — 84703 CHORIONIC GONADOTROPIN ASSAY: CPT

## 2020-11-23 NOTE — EMERGENCY DEPARTMENT REPORT
ED Abdominal Pain HPI





- General


Chief Complaint: Abdominal Pain


Stated Complaint: STOMACH ACHE/POSSIBLE FEVER


Time Seen by Provider: 20 09:43


Source: patient


Mode of arrival: Ambulatory


Limitations: No Limitations





- History of Present Illness


Initial Comments: 





30-year-old -American female patient with history of asthma presents with

complaints of upper abdominal pain, nausea, bilateral hand swelling, pain in her

feet, and overall fatigue x3 days.  She denies any shortness of breath, cough, 

chest pain, vomiting/diarrhea, or urinary symptoms.  Patient states that she 

started taking Zoloft about 5 days ago for the first time for depression.  She 

denies any other past medical history or abdominal surgical history.  No 

fever/chills/sweats per patient.





- Related Data


                                  Previous Rx's











 Medication  Instructions  Recorded  Last Taken  Type


 


Cyclobenzaprine [Flexeril 10 MG 10 mg PO QHS #20 tablet 17 Unknown Rx





TAB]    


 


Ibuprofen [Motrin] 800 mg PO Q8HR PRN #30 tablet 17 Unknown Rx


 


Lidocaine/Hydrocortisone AC 1 each RC DAILY #1 kit 17 Unknown Rx





[Lidocaine-Hc 2-2% Cream Kit]    


 


Nitrofurantoin Mono/M-Cryst 100 mg PO Q12HR #14 capsule 10/28/20 Unknown Rx





[Macrobid CAP]    


 


Famotidine [Pepcid] 20 mg PO BID 5 Days #10 tablet 20 Unknown Rx


 


Ondansetron [Zofran Odt] 4 mg PO Q8HR PRN #10 tab.rapdis 20 Unknown Rx











                                    Allergies











Allergy/AdvReac Type Severity Reaction Status Date / Time


 


shellfish derived Allergy  Hives Verified 11/15/20 08:49














ED Review of Systems


ROS: 


Stated complaint: STOMACH ACHE/POSSIBLE FEVER


Other details as noted in HPI





Constitutional: malaise.  denies: chills, fever


ENT: denies: throat pain


Respiratory: denies: cough, shortness of breath


Cardiovascular: denies: chest pain


Gastrointestinal: abdominal pain, nausea.  denies: vomiting, diarrhea, 

constipation, hematemesis, melena, hematochezia


Genitourinary: denies: urgency, dysuria, frequency, hematuria


Musculoskeletal: arthralgia


Skin: denies: rash, lesions, change in color


Neurological: denies: headache





ED Past Medical Hx





- Past Medical History


Previous Medical History?: Yes


Hx Asthma: Yes





- Surgical History


Past Surgical History?: Yes


Additional Surgical History: 





- Social History


Smoking Status: Never Smoker


Substance Use Type: None





- Medications


Home Medications: 


                                Home Medications











 Medication  Instructions  Recorded  Confirmed  Last Taken  Type


 


Cyclobenzaprine [Flexeril 10 MG 10 mg PO QHS #20 tablet 05/08/17 10/26/20 

Unknown Rx





TAB]     


 


Ibuprofen [Motrin] 800 mg PO Q8HR PRN #30 tablet 05/08/17 10/26/20 Unknown Rx


 


Lidocaine/Hydrocortisone AC 1 each RC DAILY #1 kit 07/31/17 10/26/20 Unknown Rx





[Lidocaine-Hc 2-2% Cream Kit]     


 


Nitrofurantoin Mono/M-Cryst 100 mg PO Q12HR #14 capsule 10/28/20  Unknown Rx





[Macrobid CAP]     


 


Famotidine [Pepcid] 20 mg PO BID 5 Days #10 tablet 20  Unknown Rx


 


Ondansetron [Zofran Odt] 4 mg PO Q8HR PRN #10 tab.rapdis 20  Unknown Rx














ED Physical Exam





- General


Limitations: No Limitations


General appearance: alert, in no apparent distress





- Head


Head exam: Present: atraumatic, normocephalic





- Eye


Eye exam: Present: normal appearance.  Absent: scleral icterus





- ENT


ENT exam: Present: mucous membranes moist





- Neck


Neck exam: Present: normal inspection





- Respiratory


Respiratory exam: Present: normal lung sounds bilaterally.  Absent: respiratory 

distress





- Cardiovascular


Cardiovascular Exam: Present: regular rate, normal rhythm.  Absent: systolic 

murmur, diastolic murmur, rubs, gallop





- GI/Abdominal


GI/Abdominal exam: Present: soft, tenderness (Epigastric, mild).  Absent: 

distended, guarding, rebound, rigid, normal bowel sounds





- Extremities Exam


Extremities exam: Present: normal inspection, full ROM.  Absent: joint swelling,

calf tenderness





- Back Exam


Back exam: Present: normal inspection, full ROM





- Neurological Exam


Neurological exam: Present: alert, oriented X3





- Psychiatric


Psychiatric exam: Present: normal affect, normal mood





- Skin


Skin exam: Present: warm, dry, intact, normal color.  Absent: rash, cyanosis, 

diaphoretic, erythema, ecchymosis





ED Course


                                   Vital Signs











  20





  08:46 11:36


 


Temperature 98.4 F 


 


Pulse Rate 108 H 90


 


Respiratory 16 16





Rate  


 


Blood Pressure 130/81 126/78





[Right]  


 


O2 Sat by Pulse 100 100





Oximetry  














ED Medical Decision Making





- Lab Data


Result diagrams: 


                                 20 09:58





                                 20 09:58








                                   Lab Results











  20 Range/Units





  09:46 09:58 09:58 


 


WBC   4.9   (4.5-11.0)  K/mm3


 


RBC   4.57   (3.65-5.03)  M/mm3


 


Hgb   13.5   (10.1-14.3)  gm/dl


 


Hct   39.0   (30.3-42.9)  %


 


MCV   85   (79-97)  fl


 


MCH   30   (28-32)  pg


 


MCHC   35 H   (30-34)  %


 


RDW   13.4   (13.2-15.2)  %


 


Plt Count   260   (140-440)  K/mm3


 


Lymph % (Auto)   20.6   (13.4-35.0)  %


 


Mono % (Auto)   5.4   (0.0-7.3)  %


 


Eos % (Auto)   5.9 H   (0.0-4.3)  %


 


Baso % (Auto)   0.4   (0.0-1.8)  %


 


Lymph # (Auto)   1.0 L   (1.2-5.4)  K/mm3


 


Mono # (Auto)   0.3   (0.0-0.8)  K/mm3


 


Eos # (Auto)   0.3   (0.0-0.4)  K/mm3


 


Baso # (Auto)   0.0   (0.0-0.1)  K/mm3


 


Seg Neutrophils %   67.7   (40.0-70.0)  %


 


Seg Neutrophils #   3.3   (1.8-7.7)  K/mm3


 


Sodium    135 L  (137-145)  mmol/L


 


Potassium    3.9  (3.6-5.0)  mmol/L


 


Chloride    98.2  ()  mmol/L


 


Carbon Dioxide    28  (22-30)  mmol/L


 


Anion Gap    13  mmol/L


 


BUN    14  (7-17)  mg/dL


 


Creatinine    0.8  (0.6-1.2)  mg/dL


 


Estimated GFR    > 60  ml/min


 


BUN/Creatinine Ratio    18  %


 


Glucose    120 H  ()  mg/dL


 


Calcium    9.5  (8.4-10.2)  mg/dL


 


Total Bilirubin    0.50  (0.1-1.2)  mg/dL


 


AST    16  (5-40)  units/L


 


ALT    11  (7-56)  units/L


 


Alkaline Phosphatase    66  ()  units/L


 


Total Protein    8.0  (6.3-8.2)  g/dL


 


Albumin    4.3  (3.9-5)  g/dL


 


Albumin/Globulin Ratio    1.2  %


 


Lipase     (13-60)  units/L


 


HCG, Qual     (Negative)  


 


Urine Color  Yellow    (Yellow)  


 


Urine Turbidity  Clear    (Clear)  


 


Urine pH  5.0    (5.0-7.0)  


 


Ur Specific Gravity  1.033 H    (1.003-1.030)  


 


Urine Protein  <15 mg/dl    (Negative)  mg/dL


 


Urine Glucose (UA)  Neg    (Negative)  mg/dL


 


Urine Ketones  Tr    (Negative)  mg/dL


 


Urine Blood  Neg    (Negative)  


 


Urine Nitrite  Neg    (Negative)  


 


Urine Bilirubin  Neg    (Negative)  


 


Urine Urobilinogen  4.0    (<2.0)  mg/dL


 


Ur Leukocyte Esterase  Neg    (Negative)  


 


Urine WBC (Auto)  3.0    (0.0-6.0)  /HPF


 


Urine RBC (Auto)  4.0    (0.0-6.0)  /HPF


 


U Epithel Cells (Auto)  8.0    (0-13.0)  /HPF


 


Urine Bacteria (Auto)  1+    (Negative)  /HPF


 


Urine Mucus  3+    /HPF














  20 Range/Units





  09:58 09:58 


 


WBC    (4.5-11.0)  K/mm3


 


RBC    (3.65-5.03)  M/mm3


 


Hgb    (10.1-14.3)  gm/dl


 


Hct    (30.3-42.9)  %


 


MCV    (79-97)  fl


 


MCH    (28-32)  pg


 


MCHC    (30-34)  %


 


RDW    (13.2-15.2)  %


 


Plt Count    (140-440)  K/mm3


 


Lymph % (Auto)    (13.4-35.0)  %


 


Mono % (Auto)    (0.0-7.3)  %


 


Eos % (Auto)    (0.0-4.3)  %


 


Baso % (Auto)    (0.0-1.8)  %


 


Lymph # (Auto)    (1.2-5.4)  K/mm3


 


Mono # (Auto)    (0.0-0.8)  K/mm3


 


Eos # (Auto)    (0.0-0.4)  K/mm3


 


Baso # (Auto)    (0.0-0.1)  K/mm3


 


Seg Neutrophils %    (40.0-70.0)  %


 


Seg Neutrophils #    (1.8-7.7)  K/mm3


 


Sodium    (137-145)  mmol/L


 


Potassium    (3.6-5.0)  mmol/L


 


Chloride    ()  mmol/L


 


Carbon Dioxide    (22-30)  mmol/L


 


Anion Gap    mmol/L


 


BUN    (7-17)  mg/dL


 


Creatinine    (0.6-1.2)  mg/dL


 


Estimated GFR    ml/min


 


BUN/Creatinine Ratio    %


 


Glucose    ()  mg/dL


 


Calcium    (8.4-10.2)  mg/dL


 


Total Bilirubin    (0.1-1.2)  mg/dL


 


AST    (5-40)  units/L


 


ALT    (7-56)  units/L


 


Alkaline Phosphatase    ()  units/L


 


Total Protein    (6.3-8.2)  g/dL


 


Albumin    (3.9-5)  g/dL


 


Albumin/Globulin Ratio    %


 


Lipase   31  (13-60)  units/L


 


HCG, Qual  Negative   (Negative)  


 


Urine Color    (Yellow)  


 


Urine Turbidity    (Clear)  


 


Urine pH    (5.0-7.0)  


 


Ur Specific Gravity    (1.003-1.030)  


 


Urine Protein    (Negative)  mg/dL


 


Urine Glucose (UA)    (Negative)  mg/dL


 


Urine Ketones    (Negative)  mg/dL


 


Urine Blood    (Negative)  


 


Urine Nitrite    (Negative)  


 


Urine Bilirubin    (Negative)  


 


Urine Urobilinogen    (<2.0)  mg/dL


 


Ur Leukocyte Esterase    (Negative)  


 


Urine WBC (Auto)    (0.0-6.0)  /HPF


 


Urine RBC (Auto)    (0.0-6.0)  /HPF


 


U Epithel Cells (Auto)    (0-13.0)  /HPF


 


Urine Bacteria (Auto)    (Negative)  /HPF


 


Urine Mucus    /HPF














- Medical Decision Making








30-year-old -American female patient presents with complaints of upper 

abdominal pain, nausea, bilateral hand swelling, pain in her feet, and overall 

fatigue x3 days.  She denies any shortness of breath, cough, chest pain, 

vomiting/diarrhea, or urinary symptoms.  Patient states that she started taking 

Zoloft about 5 days ago for the first time for depression.  She denies any other

past medical history or abdominal surgical history.  No fever/chills/sweats per 

patient.





Physical exam is normal.  No significant abnormalities are noted on lab studies.

 Patient states abdominal pain improved with Zofran and Pepcid.  Suspect patient

symptoms are due to to adverse side effects of Zoloft-recommend follow-up with 

her doctor for further evaluation and possible change of medication within 2 

days.  Patient to discontinue Zoloft for now.  Her vitals are normal, she is 

well-appearing, she is stable for discharge home.  Strict return precautions 

were discussed in detail with patient who verbalizes understanding.


Critical care attestation.: 


If time is entered above; I have spent that time in minutes in the direct care 

of this critically ill patient, excluding procedure time.








ED Disposition


Clinical Impression: 


 Nausea





Medication reaction


Qualifiers:


 Encounter type: initial encounter Qualified Code(s): T50.905A - Adverse effect 

of unspecified drugs, medicaments and biological substances, initial encounter





Disposition: DC-01 TO HOME OR SELFCARE


Is pt being admited?: No


Condition: Stable


Instructions:  Nausea, Adult, Drug Allergy, Sertraline tablets, Abdominal Pain 

(ED)


Prescriptions: 


Famotidine [Pepcid] 20 mg PO BID 5 Days #10 tablet


Ondansetron [Zofran Odt] 4 mg PO Q8HR PRN #10 tab.rapdis


 PRN Reason: Nausea


Referrals: 


PRIMARY CARE,MD [Primary Care Provider] - 20

## 2020-11-23 NOTE — EVENT NOTE
ED Screening Note


Date of service: 11/23/20


Time: 15:53


ED Screening Note: 


Patient was seen this morning for abdominal pain and anxiety but went to the gas

station called 911 to return.  She states she is now having spasms all over her 

body and is feeling strange.  She is alert and oriented x3.  She recently 

started Zoloft for her anxiety depression.





This initial assessment/diagnostic orders/clinical plan/treatment(s) is/are 

subject to change based on patients health status, clinical progression and re-

assessment by fellow clinical providers in the ED. Further treatment and workup 

at subsequent clinical providers discretion. Patient/guardian urged not to elope

from the ED as their condition may be serious if not clinically assessed and 

managed. 





Initial orders include: Psych order set